# Patient Record
Sex: FEMALE | Race: WHITE | NOT HISPANIC OR LATINO | Employment: FULL TIME | URBAN - METROPOLITAN AREA
[De-identification: names, ages, dates, MRNs, and addresses within clinical notes are randomized per-mention and may not be internally consistent; named-entity substitution may affect disease eponyms.]

---

## 2019-12-06 ENCOUNTER — TRANSFERRED RECORDS (OUTPATIENT)
Dept: HEALTH INFORMATION MANAGEMENT | Facility: CLINIC | Age: 34
End: 2019-12-06

## 2021-10-21 ENCOUNTER — TRANSFERRED RECORDS (OUTPATIENT)
Dept: HEALTH INFORMATION MANAGEMENT | Facility: CLINIC | Age: 36
End: 2021-10-21

## 2022-05-14 ENCOUNTER — MEDICAL CORRESPONDENCE (OUTPATIENT)
Dept: HEALTH INFORMATION MANAGEMENT | Facility: CLINIC | Age: 37
End: 2022-05-14
Payer: COMMERCIAL

## 2022-05-23 ENCOUNTER — TRANSCRIBE ORDERS (OUTPATIENT)
Dept: OTHER | Age: 37
End: 2022-05-23
Payer: COMMERCIAL

## 2022-05-23 DIAGNOSIS — R42 DIZZINESS: Primary | ICD-10-CM

## 2022-05-23 DIAGNOSIS — I95.1 ORTHOSTATIC HYPOTENSION: ICD-10-CM

## 2022-07-10 ENCOUNTER — HEALTH MAINTENANCE LETTER (OUTPATIENT)
Age: 37
End: 2022-07-10

## 2022-07-19 ENCOUNTER — OFFICE VISIT (OUTPATIENT)
Dept: CARDIOLOGY | Facility: CLINIC | Age: 37
End: 2022-07-19
Attending: FAMILY MEDICINE
Payer: COMMERCIAL

## 2022-07-19 ENCOUNTER — HOSPITAL ENCOUNTER (OUTPATIENT)
Facility: CLINIC | Age: 37
End: 2022-07-19
Attending: INTERNAL MEDICINE | Admitting: INTERNAL MEDICINE
Payer: COMMERCIAL

## 2022-07-19 VITALS
SYSTOLIC BLOOD PRESSURE: 102 MMHG | BODY MASS INDEX: 25.68 KG/M2 | OXYGEN SATURATION: 94 % | HEART RATE: 107 BPM | HEIGHT: 64 IN | DIASTOLIC BLOOD PRESSURE: 72 MMHG | WEIGHT: 150.4 LBS

## 2022-07-19 DIAGNOSIS — I95.1 ORTHOSTATIC HYPOTENSION: ICD-10-CM

## 2022-07-19 DIAGNOSIS — R42 DIZZINESS: ICD-10-CM

## 2022-07-19 PROCEDURE — G0463 HOSPITAL OUTPT CLINIC VISIT: HCPCS

## 2022-07-19 PROCEDURE — 99204 OFFICE O/P NEW MOD 45 MIN: CPT | Performed by: INTERNAL MEDICINE

## 2022-07-19 RX ORDER — MULTIVIT WITH MINERALS/LUTEIN
TABLET ORAL DAILY
COMMUNITY
Start: 2019-08-01

## 2022-07-19 RX ORDER — AMOXICILLIN 500 MG
CAPSULE ORAL DAILY
COMMUNITY
Start: 2017-04-01

## 2022-07-19 RX ORDER — SERTRALINE HYDROCHLORIDE 100 MG/1
200 TABLET, FILM COATED ORAL DAILY
COMMUNITY
Start: 2018-10-20 | End: 2023-03-16

## 2022-07-19 RX ORDER — SODIUM CHLORIDE 9 MG/ML
INJECTION, SOLUTION INTRAVENOUS CONTINUOUS
Status: CANCELLED | OUTPATIENT
Start: 2022-07-19

## 2022-07-19 RX ORDER — DEXTROAMPHETAMINE SACCHARATE, AMPHETAMINE ASPARTATE, DEXTROAMPHETAMINE SULFATE AND AMPHETAMINE SULFATE 2.5; 2.5; 2.5; 2.5 MG/1; MG/1; MG/1; MG/1
TABLET ORAL PRN
COMMUNITY
Start: 2017-01-01

## 2022-07-19 RX ORDER — DIPHENHYDRAMINE HCL 25 MG
TABLET ORAL PRN
COMMUNITY
Start: 2008-01-01

## 2022-07-19 RX ORDER — LIDOCAINE 40 MG/G
CREAM TOPICAL
Status: CANCELLED | OUTPATIENT
Start: 2022-07-19

## 2022-07-19 RX ORDER — FLUTICASONE PROPIONATE 50 MCG
2 SPRAY, SUSPENSION (ML) NASAL DAILY
COMMUNITY

## 2022-07-19 RX ORDER — KETOCONAZOLE 20 MG/G
CREAM TOPICAL PRN
COMMUNITY
Start: 2022-05-12

## 2022-07-19 RX ORDER — ALPRAZOLAM 1 MG
1 TABLET ORAL PRN
COMMUNITY
Start: 2022-03-14

## 2022-07-19 RX ORDER — ESTRADIOL 1 MG/1
1.5 TABLET ORAL DAILY
COMMUNITY
Start: 2019-05-01 | End: 2023-03-15

## 2022-07-19 ASSESSMENT — PAIN SCALES - GENERAL: PAINLEVEL: NO PAIN (0)

## 2022-07-19 ASSESSMENT — PATIENT HEALTH QUESTIONNAIRE - PHQ9: SUM OF ALL RESPONSES TO PHQ QUESTIONS 1-9: 12

## 2022-07-19 NOTE — PATIENT INSTRUCTIONS
July 19, 2022    Cardiology Provider You Saw During Your Visit: Dr. Herrera      Medication Changes: None      Follow Up:   -TILT test  -Echocardiogram  -Follow up with Dr. Herrera as needed    You have been scheduled for a Tilt Table/Autonomic study.    Below are instructions, if you have questions please contact our office.     1. You should arrive at the [facility] - [address] [place] [address]    2. Report to the [facility] waiting room to check in. Your procedure will be done in the Catheterization Lab.     3. Wear comfortable clothing. (sweat/yoga pants, t-shirt). Please allow 4-5 hours for this procedure to be completed.     4. Do not eat or drink ANYTHING for 6 hours prior to arrival.     5. You may take any scheduled medications the morning of your procedure with a SIP of water- unless instructed by your physician differently.     DO NOT TAKE ANY VITAMINS, MINERALS OR HERBAL SUPPLEMENTS.     6. You may drive yourself to and from this procedure.     With questions or concerns on procedure please call   [facility]    Office: [phone]   -----     Tilt Table Testing     You will be monitored by your health care team throughout the entire test.     Tilt table testing is a simple test that helps the doctor identify the cause of your fainting. It checks how changes in body position can affect your blood pressure and heart rate. To do this, you are placed on a table that is tilted upward. The test tries to recreate fainting symptoms while your blood pressure and heart rate are monitored. The test can be done in a hospital or at your doctor s office.  Before your test  For best results, prepare for the test as directed. Keep in mind:  When you schedule the test, be sure to mention all the medicines you take. Ask if you take them the day of the test.  Arrange for a ride home after the test.  After the midnight before the test, don t eat or drink anything.  On the day of the test, dress for ease and comfort.  Wear a two-piece outfit, top and bottoms. You will need to undress from the waist up and put on a short hospital gown.  During your test  Tilt table testing takes about 60 minutes. The testing room is kept quiet and dimly lit. During the test:  Small pads (electrodes) are put on your chest to monitor your heartbeat.  A blood pressure cuff is put on your arm.  An IV (intravenous) line may be placed in your other arm. The IV line delivers fluids and medicine if needed.  You ll be asked to lie flat on the table. Your upper body and thighs will be held in place with straps.  Baseline vital signs are recorded such as blood pressure, breathing rate, and pulse.  The table tilts until you are almost standing upright. Heart rate and blood pressure are continuously monitored. Any changes in these readings or any symptoms that develop are recorded.  You ll remain upright for up to 60 minutes. In most cases, the test is over in 30 to 45 minutes.  Sometimes the technician or healthcare provider may rub the arteries in your neck to check for a fainting reflex.  Occasionally, people are given certain IV medicines to stimulate heart rate and heart pumping and then are retested. These medications may make you feel shaky or anxious.  After your test  Any medicines used during the test should leave your system within 15 minutes. If you were told to skip daily medicines before the test, ask if you should start taking them again. You re likely to be sent home right away. It s a good idea to have a friend or family member drive. If you fainted during the test, you may want to rest for a few hours once you re home.  Report any symptoms you have during the test  Let the doctor or technician know if you notice:  Feeling like you are going to faint  Overall weakness  Nausea  Dimmed vision  Sweating, dizziness,  or lightheadedness  A rapid heartbeat  Chest pain  Any other symptoms   Date Last Reviewed: 12/1/2016 2000-2017 [employer]. [address] [place] [address]. All rights reserved. This information is not intended as a substitute for professional medical care. Always follow your healthcare professional's instructions.           Follow the American Heart Association Diet and Lifestyle Recommendations:  -Limit saturated fat, trans fat, sodium, red meat, sweets and sugar-sweetened beverages. If you choose to eat red meat, compare labels and select the leanest cuts available.  -Aim for at least 150 minutes of moderate physical activity or 75 minutes of vigorous physical activity - or an equal combination of both - each week.      To Reach Us:  -During business hours: [phone], press option # 1 to schedule an appointment or to leave a message for your care team.     -After hours, weekends or holidays: [phone], press option #4 and ask to speak to the on-call cardiologist. Inform them you are a patient at the [facility].      Polly Meza RN  Cardiology Care Coordinator - General Cardiology  [facility]

## 2022-07-19 NOTE — NURSING NOTE
July 19, 2022      Medication Changes: None      Follow Up:   -TILT test--reviewed pre-procedure instructions with pt  -Echocardiogram  -Follow up with Dr. Herrera as needed      Patient verbalized understanding of all health information given and agreed to call with further questions or concerns.      Polly Meza RN

## 2022-07-19 NOTE — PROGRESS NOTES
SUBJECTIVE:  Tangela Corondao is a 36 year old female who presents for evaluation of dizziness.  Since middle school patient is having intermittent dizziness.  This is postural.  Never lost consciousness.  Also her heart rate at rest is around 60 to 65 bpm but goes up to 130 to 150 bpm when she is standing up or with minimal activity.  Patient do have an extensive log of her blood pressure as well as pulse rate.  No significant drop in blood pressure is noted during these episodes though she was checking for postural hypotension.  Patient started taking excessive fluids and electrolytes.  Also tried compression stockings but these things did not help much.  Apart from his dizzy spells and tachycardia has no other cardiac history.  She is not a very active person but denied cardiac symptoms.  No significant family history.  Currently she is not on any cardiac medications.  There are no problems to display for this patient.   .  Current Outpatient Medications   Medication Sig     ALPRAZolam (XANAX) 1 MG tablet Take 1 mg by mouth as needed     amphetamine-dextroamphetamine (ADDERALL) 10 MG tablet as needed     diphenhydrAMINE (BENADRYL) 25 MG tablet as needed     estradiol (ESTRACE) 1 MG tablet Take 1.5 mg by mouth daily     fluticasone (FLONASE) 50 MCG/ACT nasal spray 2 sprays daily     Kava, Piper methysticum, 425 MG CAPS as needed     ketoconazole (NIZORAL) 2 % external cream as needed     melatonin 5 MG tablet as needed     multivitamin (CENTRUM SILVER) tablet daily     Omega-3 Fatty Acids (FISH OIL) 1200 MG capsule daily     sertraline (ZOLOFT) 100 MG tablet Take 200 mg by mouth daily     No current facility-administered medications for this visit.     No past medical history on file.  No past surgical history on file.  Allergies   Allergen Reactions     Azithromycin Other (See Comments)     Mouth Sores       Social History     Socioeconomic History     Marital status:      Spouse name: Not on file  "    Number of children: Not on file     Years of education: Not on file     Highest education level: Not on file   Occupational History     Not on file   Tobacco Use     Smoking status: Former Smoker     Types: Cigarettes, Vaping Device     Smokeless tobacco: Never Used   Substance and Sexual Activity     Alcohol use: Not on file     Drug use: Not on file     Sexual activity: Not on file   Other Topics Concern     Not on file   Social History Narrative     Not on file     Social Determinants of Health     Financial Resource Strain: Not on file   Food Insecurity: Not on file   Transportation Needs: Not on file   Physical Activity: Not on file   Stress: Not on file   Social Connections: Not on file   Intimate Partner Violence: Not on file   Housing Stability: Not on file     No family history on file.       REVIEW OF SYSTEMS:  General: negative, fever, chills, night sweats  Skin: negative, acne, rash and scaling  Eyes: negative, double vision, eye pain and photophobia  Ears/Nose/Throat: negative, nasal congestion and purulent rhinorrhea  Respiratory: No dyspnea on exertion, No cough, No hemoptysis and negative  Cardiovascular: negative, chest pain, exertional chest pain or pressure, paroxysmal nocturnal dyspnea, dyspnea on exertion and orthopnea       OBJECTIVE:  Blood pressure 102/72, pulse 107, height 1.637 m (5' 4.45\"), weight 68.2 kg (150 lb 6.4 oz), SpO2 94 %.  General Appearance: healthy, alert and no distress  Head: Normocephalic. No masses, lesions, tenderness or abnormalities  Eyes: conjuctiva clear, PERRL, EOM intact  Ears: External ears normal. Canals clear. TM's normal.  Nose: Nares normal  Mouth: normal  Neck: Supple, no cervical adenopathy, no thyromegaly  Lungs: clear to auscultation  Cardiac: regular rate and rhythm, normal S1 and S2, no murmur       ASSESSMENT/PLAN:  Patient here for evaluation of dizziness.  Claims her dizziness is postural.  But also claims that her heart rate goes from normal to " 130 to 160 bpm while standing up or with minimal activity.  According to her she feels dizzy with this increased heart rate.  She never lost consciousness.  According to her patient is very careful.  It appears very worried extensively and tried hydration, electrolytes and compression stockings.  This did not help her much.  She has no prior cardiac history.  No current cardiac symptoms.  No significant cardiac risk factors.  Patient was evaluated in 2019 for similar symptoms.  Records indicate that patient had a normal EKG and Holter monitoring showing very few PVCs and PACs.  During dizzy spell patient had normal sinus rhythm.  From the history it appears more like POTS postural hypotension.  As patient is already trying extra fluid and electrolytes as well as compression stockings unsure whether it is hypotensive episodes that is causing her symptoms.  Also unsure whether the tachycardia is responsible.  Patient keeps her spreadsheet checking for postural hypotension.  No significant drop in blood pressure was noted though unsure how long she waited before checking the blood pressure.  We will plan for an echocardiogram as well as a tilt table test.  Patient will be contacted with the test results.  Return to Clinic as needed.  Total visit duration 45 minutes.  This included face-to-face interview, physical exam, chart review, review of Care Everywhere, EKG, Holter monitor and documentation.

## 2022-07-19 NOTE — NURSING NOTE
Chief Complaint   Patient presents with     New Patient     establish care - referral from PCP Juice @ [facility]            Vitals were taken and medications reconciled.     Darryl Mcnally, EMT   10:39 AM

## 2022-08-02 ENCOUNTER — ANCILLARY PROCEDURE (OUTPATIENT)
Dept: CARDIOLOGY | Facility: CLINIC | Age: 37
End: 2022-08-02
Attending: INTERNAL MEDICINE
Payer: COMMERCIAL

## 2022-08-02 DIAGNOSIS — R42 DIZZINESS: ICD-10-CM

## 2022-08-02 DIAGNOSIS — I95.1 ORTHOSTATIC HYPOTENSION: ICD-10-CM

## 2022-08-02 LAB — LVEF ECHO: NORMAL

## 2022-08-02 PROCEDURE — 93306 TTE W/DOPPLER COMPLETE: CPT | Performed by: INTERNAL MEDICINE

## 2022-08-12 ENCOUNTER — TELEPHONE (OUTPATIENT)
Dept: CARDIOLOGY | Facility: CLINIC | Age: 37
End: 2022-08-12

## 2022-09-11 ENCOUNTER — HEALTH MAINTENANCE LETTER (OUTPATIENT)
Age: 37
End: 2022-09-11

## 2023-02-10 ENCOUNTER — E-VISIT (OUTPATIENT)
Dept: URGENT CARE | Facility: CLINIC | Age: 38
End: 2023-02-10
Payer: COMMERCIAL

## 2023-02-10 DIAGNOSIS — J01.90 ACUTE BACTERIAL SINUSITIS: Primary | ICD-10-CM

## 2023-02-10 DIAGNOSIS — B96.89 ACUTE BACTERIAL SINUSITIS: Primary | ICD-10-CM

## 2023-02-10 PROCEDURE — 99421 OL DIG E/M SVC 5-10 MIN: CPT | Performed by: PHYSICIAN ASSISTANT

## 2023-02-10 NOTE — PATIENT INSTRUCTIONS
Sinusitis (Antibiotic Treatment)    The sinuses are air-filled spaces within the bones of the face. They connect to the inside of the nose. Sinusitis is an inflammation of the tissue that lines the sinuses. Sinusitis can occur during a cold. It can also happen due to allergies to pollens and other particles in the air. Sinusitis can cause symptoms of sinus congestion and a feeling of fullness. A sinus infection causes fever, headache, and facial pain. There is often green or yellow fluid draining from the nose or into the back of the throat (post-nasal drip). You have been given antibiotics to treat this condition.   Home care    Take the full course of antibiotics as instructed. Don't stop taking them, even when you feel better.    Drink plenty of water, hot tea, and other liquids as directed by the healthcare provider. This may help thin nasal mucus. It also may help your sinuses drain fluids.    Heat may help soothe painful areas of your face. Use a towel soaked in hot water. Or,  the shower and direct the warm spray onto your face. Using a vaporizer along with a menthol rub at night may also help soothe symptoms.     An expectorant with guaifenesin may help thin nasal mucus and help your sinuses drain fluids. Talk with your provider or pharmacists before taking an over-the-counter (OTC) medicine if you have any questions about it or its side effects..    You can use an OTC decongestant, unless a similar medicine was prescribed to you. Nasal sprays work the fastest. Use one that contains phenylephrine or oxymetazoline. First blow your nose gently. Then use the spray. Don't use these medicines more often than directed on the label. If you do, your symptoms may get worse. You may also take pills that contain pseudoephedrine. Don t use products that combine multiple medicines. This is because side effects may be increased. Read labels. You can also ask the pharmacist for help. (People with high blood  pressure should not use decongestants. They can raise blood pressure.) Talk with your provider or pharmacist if you have any questions about the medicine..    OTC antihistamines may help if allergies contributed to your sinusitis. Talk with your provider or pharmacist if you have any questions about the medicine..    Don't use nasal rinses or irrigation during an acute sinus infection, unless your healthcare provider tells you to. Rinsing may spread the infection to other areas in your sinuses.    Use acetaminophen or ibuprofen to control pain, unless another pain medicine was prescribed to you. If you have chronic liver or kidney disease or ever had a stomach ulcer, talk with your healthcare provider before using these medicines. Never give aspirin to anyone under age 18 who is ill with a fever. It may cause severe liver damage.    Don't smoke. This can make symptoms worse.    Follow-up care  Follow up with your healthcare provider, or as advised.   When to seek medical advice  Call your healthcare provider if any of these occur:     Facial pain or headache that gets worse    Stiff neck    Unusual drowsiness or confusion    Swelling of your forehead or eyelids    Symptoms don't go away in 10 days    Vision problems, such as blurred or double vision    Fever of 100.4 F (38 C) or higher, or as directed by your healthcare provider  Call 911  Call 911 if any of these occur:     Seizure    Trouble breathing    Feeling dizzy or faint    Fingernails, skin or lips look blue, purple , or gray  Prevention  Here are steps you can take to help prevent an infection:     Keep good hand washing habits.    Don t have close contact with people who have sore throats, colds, or other upper respiratory infections.    Don t smoke, and stay away from secondhand smoke.    Stay up to date with of your vaccines.  [employer] last reviewed this educational content on 12/1/2019 2000-2021 [employer]. All rights reserved. This  information is not intended as a substitute for professional medical care. Always follow your healthcare professional's instructions.        Dear Tangela Coronado    After reviewing your responses, I've been able to diagnose you with Acute bacterial sinusitis.      Based on your responses and diagnosis, I have prescribed   Orders Placed This Encounter     amoxicillin-clavulanate (AUGMENTIN) 875-125 MG tablet    to treat your symptoms. I have sent this to your pharmacy.?     It is also important to stay well hydrated, get lots of rest and take over-the-counter decongestants,?tylenol?or ibuprofen if you?are able to?take those medications per your primary care provider to help relieve discomfort.?     It is important that you take?all of?your prescribed medication even if your symptoms are improving after a few doses.? Taking?all of?your medicine helps prevent the symptoms from returning.?     If your symptoms worsen, you develop severe headache, vomiting, high fever (>102), or are not improving in 7 days, please contact your primary care provider for an appointment or visit any of our convenient Walk-in Care or Urgent Care Centers to be seen which can be found on our website?here.?     Thanks again for choosing?us?as your health care partner,?   ?  Rebel Julien, [facility], PA-C?

## 2023-03-04 ENCOUNTER — E-VISIT (OUTPATIENT)
Dept: URGENT CARE | Facility: CLINIC | Age: 38
End: 2023-03-04
Payer: COMMERCIAL

## 2023-03-04 DIAGNOSIS — R35.0 URINARY FREQUENCY: Primary | ICD-10-CM

## 2023-03-04 PROCEDURE — 99421 OL DIG E/M SVC 5-10 MIN: CPT | Performed by: INTERNAL MEDICINE

## 2023-03-04 NOTE — PATIENT INSTRUCTIONS
Dear Tangela Coronado,     After reviewing your responses, I would like you to come in for a urine test to make sure we treat you correctly. This urine test is to evaluate you for a possible urinary tract infection, and should be scheduled for today or tomorrow. Schedule a Lab Only appointment here.     Lab appointments are not available at most locations on the weekends. If no Lab Only appointment is available, you should be seen in any of our convenient Walk-in or Urgent Care Centers, which can be found on our website here.     You will receive instructions with your results in [facility] once they are available.     If your symptoms worsen, you develop pain in your back or stomach, develop fevers, or are not improving in 5 days, please contact your primary care provider for an appointment or visit a Walk-in or Urgent Care Center to be seen.     Thanks again for choosing us as your health care partner,     Allegra Live MD

## 2023-03-16 ENCOUNTER — OFFICE VISIT (OUTPATIENT)
Dept: URGENT CARE | Facility: URGENT CARE | Age: 38
End: 2023-03-16
Payer: COMMERCIAL

## 2023-03-16 VITALS
DIASTOLIC BLOOD PRESSURE: 89 MMHG | BODY MASS INDEX: 27.52 KG/M2 | WEIGHT: 162.6 LBS | HEART RATE: 104 BPM | SYSTOLIC BLOOD PRESSURE: 134 MMHG | OXYGEN SATURATION: 95 % | TEMPERATURE: 97.3 F

## 2023-03-16 DIAGNOSIS — S61.212A LACERATION OF RIGHT MIDDLE FINGER WITHOUT FOREIGN BODY WITHOUT DAMAGE TO NAIL, INITIAL ENCOUNTER: Primary | ICD-10-CM

## 2023-03-16 PROBLEM — F90.0 ATTENTION DEFICIT HYPERACTIVITY DISORDER (ADHD), INATTENTIVE TYPE, MILD: Status: ACTIVE | Noted: 2017-12-04

## 2023-03-16 PROCEDURE — 99207 PR NO CHARGE LOS: CPT

## 2023-03-16 PROCEDURE — 12001 RPR S/N/AX/GEN/TRNK 2.5CM/<: CPT | Mod: F7

## 2023-03-16 RX ORDER — VENLAFAXINE HYDROCHLORIDE 150 MG/1
1 CAPSULE, EXTENDED RELEASE ORAL DAILY
COMMUNITY
Start: 2022-09-23 | End: 2023-09-23

## 2023-03-17 NOTE — PROGRESS NOTES
ASSESSMENT:  (E68.025I) Laceration of right middle finger without foreign body without damage to nail, initial encounter  (primary encounter diagnosis)  Plan: WOUND CLOSURE BY ADHESIVE    PLAN:  Informed the patient that the laceration was repaired via a skin adhesive today in the clinic.  Instructed the patient to apply bacitracin and Band-Aid for the next 48 hours and keep the wound clean and dry for the next 48 hours.  After 48 hours it is okay to get wet but do not submerge.  Discussed the need to return to clinic with any redness, pain, drainage, warmth, swelling, fever/chills and or any nausea/vomiting.  Patient acknowledged her understanding of the above plan.    The use of Dragon/PowerMic dictation services may have been used to construct the content in this note; any grammatical or spelling errors are non-intentional. Please contact the author of this note directly if you are in need of any clarification.      MANUEL Villa CNP      SUBJECTIVE:  Tangela Coronado is a 37 year old female who presents to the clinic with a laceration on the right middle finger sustained a few hours ago.  This is a non-work related injury.  Mechanism of injury: knife.    Associated symptoms: Denies numbness, weakness, or loss of function  Tetanus booster within the past 10 years    ROS:   Negative except noted above.    OBJECTIVE:  Weight 73.8 kg (162 lb 9.6 oz).  The patient appears today in alert and in no apparent distress distress  Size of laceration: 0.5 centimeters  Characteristics of the laceration: bleeding- mild and clean  Tendon function intact: yes  Sensation to light touch intact: yes  Pulses intact: yes

## 2023-03-17 NOTE — PATIENT INSTRUCTIONS
Laceration repaired via skin adhesive.  Apply bacitracin and Band-Aid for the next 48 hours.  Keep the wound clean and dry and after 48 hours okay to get wet but do not submerge.  Return to clinic with any redness, pain, drainage, warmth, swelling, fever/chills and or any nausea/vomiting.

## 2023-04-25 ENCOUNTER — MYC REFILL (OUTPATIENT)
Dept: FAMILY MEDICINE | Facility: CLINIC | Age: 38
End: 2023-04-25

## 2023-04-25 RX ORDER — VENLAFAXINE HYDROCHLORIDE 150 MG/1
150 CAPSULE, EXTENDED RELEASE ORAL DAILY
OUTPATIENT
Start: 2023-04-25

## 2023-04-25 NOTE — TELEPHONE ENCOUNTER
Venlafaxine (Effexor XR) 150 mg    Last Office Visit: NONE  Future [facility] App[place]ments: NONE  Medication last refilled: Historical    Never seen at [facility].  Denied refill.  Needs to be seen at [facility] before refill approval.    ESDRAS CavanaughN, RN, CCM

## 2023-07-29 ENCOUNTER — HEALTH MAINTENANCE LETTER (OUTPATIENT)
Age: 38
End: 2023-07-29

## 2023-08-28 ENCOUNTER — MYC MEDICAL ADVICE (OUTPATIENT)
Dept: FAMILY MEDICINE | Facility: CLINIC | Age: 38
End: 2023-08-28

## 2023-08-28 DIAGNOSIS — K63.8219 SMALL INTESTINAL BACTERIAL OVERGROWTH (SIBO): Primary | ICD-10-CM

## 2023-08-28 NOTE — TELEPHONE ENCOUNTER
Medication requested: amoxicillin-clavulanate (AUGMENTIN) 875-125 MG tablet   Last office visit: None  [facility] appointments: None  Medication last refilled: 3/29/2022; 28 tabs + 0 refills  Last qualifying labs: n/a    Routing refill request to provider for review/approval because:  Drug not active on patient's medication list  [place] [facility]    SAGRARIO Dubois, RN  08/28/23, 1:46 PM

## 2024-04-24 DIAGNOSIS — Z90.710 S/P HYSTERECTOMY WITH OOPHORECTOMY: ICD-10-CM

## 2024-04-24 DIAGNOSIS — Z90.721 S/P HYSTERECTOMY WITH OOPHORECTOMY: ICD-10-CM

## 2024-04-24 RX ORDER — ESTRADIOL 1 MG/1
1.5 TABLET ORAL DAILY
Qty: 135 TABLET | Refills: 1 | Status: CANCELLED | OUTPATIENT
Start: 2024-04-24

## 2024-09-21 ENCOUNTER — HEALTH MAINTENANCE LETTER (OUTPATIENT)
Age: 39
End: 2024-09-21